# Patient Record
Sex: FEMALE | Race: BLACK OR AFRICAN AMERICAN | NOT HISPANIC OR LATINO
[De-identification: names, ages, dates, MRNs, and addresses within clinical notes are randomized per-mention and may not be internally consistent; named-entity substitution may affect disease eponyms.]

---

## 2019-05-10 ENCOUNTER — APPOINTMENT (OUTPATIENT)
Dept: OTOLARYNGOLOGY | Facility: CLINIC | Age: 49
End: 2019-05-10
Payer: COMMERCIAL

## 2019-05-10 VITALS
HEART RATE: 64 BPM | DIASTOLIC BLOOD PRESSURE: 93 MMHG | SYSTOLIC BLOOD PRESSURE: 151 MMHG | HEIGHT: 64.37 IN | WEIGHT: 162 LBS | BODY MASS INDEX: 27.66 KG/M2

## 2019-05-10 VITALS — BODY MASS INDEX: 26.96 KG/M2 | HEIGHT: 65 IN

## 2019-05-10 DIAGNOSIS — G47.33 OBSTRUCTIVE SLEEP APNEA (ADULT) (PEDIATRIC): ICD-10-CM

## 2019-05-10 DIAGNOSIS — J34.89 OTHER SPECIFIED DISORDERS OF NOSE AND NASAL SINUSES: ICD-10-CM

## 2019-05-10 DIAGNOSIS — R09.81 NASAL CONGESTION: ICD-10-CM

## 2019-05-10 DIAGNOSIS — J30.0 VASOMOTOR RHINITIS: ICD-10-CM

## 2019-05-10 PROCEDURE — 31231 NASAL ENDOSCOPY DX: CPT

## 2019-05-10 PROCEDURE — 99204 OFFICE O/P NEW MOD 45 MIN: CPT | Mod: 25

## 2019-05-10 RX ORDER — ERGOCALCIFEROL 1.25 MG/1
1.25 MG CAPSULE, LIQUID FILLED ORAL
Qty: 4 | Refills: 0 | Status: COMPLETED | COMMUNITY
Start: 2019-01-22

## 2019-05-10 RX ORDER — CHLORHEXIDINE GLUCONATE, 0.12% ORAL RINSE 1.2 MG/ML
0.12 SOLUTION DENTAL
Qty: 946 | Refills: 0 | Status: COMPLETED | COMMUNITY
Start: 2019-03-23

## 2019-05-10 RX ORDER — AMOXICILLIN 500 MG/1
500 CAPSULE ORAL
Qty: 40 | Refills: 0 | Status: COMPLETED | COMMUNITY
Start: 2019-03-23

## 2019-05-10 RX ORDER — IBUPROFEN 800 MG/1
800 TABLET, FILM COATED ORAL
Qty: 30 | Refills: 0 | Status: COMPLETED | COMMUNITY
Start: 2019-03-23

## 2019-05-10 NOTE — PROCEDURE
[FreeTextEntry6] : Afrin and lidocaine were topically sprayed. Flexible scope #2 was used. Right nasal passage with normal inferior, middle and superior turbinates. Nasal passage patent with clear middle meatus and sphenoethmoid recess. Left nasal passage with normal inferior, middle and superior turbinates. Nasal passage was patent with clear middle meatus and sphenoethmoid recess. No mucopurulence or polyps appreciated. Nasopharynx clear.

## 2019-05-10 NOTE — CONSULT LETTER
[Courtesy Letter:] : I had the pleasure of seeing your patient, [unfilled], in my office today. [Dear  ___] : Dear  [unfilled], [Please see my note below.] : Please see my note below. [Consult Closing:] : Thank you very much for allowing me to participate in the care of this patient.  If you have any questions, please do not hesitate to contact me. [Sincerely,] : Sincerely, [FreeTextEntry3] : Sonya Pablo MD\par Otolaryngology and Cranial Base Surgery\par Attending Physician - Department of Otolaryngology and Head & Neck Surgery\par [facility]\par  - [employer] at [facility]\par Office: [phone]\par Fax: [phone]\par

## 2019-05-10 NOTE — ASSESSMENT
[FreeTextEntry1] : left maxillary pain:\par - CT sinus to eval further\par - f/u after CT scan\par \par HTN\par - F/U with PMD

## 2019-05-10 NOTE — HISTORY OF PRESENT ILLNESS
[de-identified] : 47 y/o F with left maxillary sinus pressure since having root canal at 19 y/o.  Also notes left sided nasal congestion.  No frequent nasal d/c.  Notes gets 1-2 sinus infections per year.  \par Uses Flonase only if having URI or during allergy season.  Notes increased sneezing and watery eyes during spring time.  Also will use a Claritin.   No hx of allergy testing.

## 2019-05-23 ENCOUNTER — FORM ENCOUNTER (OUTPATIENT)
Age: 49
End: 2019-05-23

## 2019-05-24 ENCOUNTER — APPOINTMENT (OUTPATIENT)
Dept: CT IMAGING | Facility: CLINIC | Age: 49
End: 2019-05-24
Payer: COMMERCIAL

## 2019-05-24 ENCOUNTER — OUTPATIENT (OUTPATIENT)
Dept: OUTPATIENT SERVICES | Facility: HOSPITAL | Age: 49
LOS: 1 days | End: 2019-05-24
Payer: COMMERCIAL

## 2019-05-24 DIAGNOSIS — Z00.8 ENCOUNTER FOR OTHER GENERAL EXAMINATION: ICD-10-CM

## 2019-05-24 PROCEDURE — 70486 CT MAXILLOFACIAL W/O DYE: CPT | Mod: 26

## 2019-05-24 PROCEDURE — 70486 CT MAXILLOFACIAL W/O DYE: CPT

## 2019-06-17 ENCOUNTER — APPOINTMENT (OUTPATIENT)
Dept: PULMONOLOGY | Facility: CLINIC | Age: 49
End: 2019-06-17
Payer: COMMERCIAL

## 2019-06-17 VITALS
HEIGHT: 65 IN | BODY MASS INDEX: 27.66 KG/M2 | WEIGHT: 166 LBS | SYSTOLIC BLOOD PRESSURE: 126 MMHG | RESPIRATION RATE: 16 BRPM | DIASTOLIC BLOOD PRESSURE: 81 MMHG | HEART RATE: 73 BPM | TEMPERATURE: 97.8 F

## 2019-06-17 DIAGNOSIS — G47.33 OBSTRUCTIVE SLEEP APNEA (ADULT) (PEDIATRIC): ICD-10-CM

## 2019-06-17 PROCEDURE — 99204 OFFICE O/P NEW MOD 45 MIN: CPT | Mod: GC

## 2019-06-17 NOTE — PHYSICAL EXAM
[General Appearance - Well Developed] : well developed [Normal Appearance] : normal appearance [General Appearance - Well Nourished] : well nourished [Normal Conjunctiva] : the conjunctiva exhibited no abnormalities [Neck Appearance] : the appearance of the neck was normal [Neck Circumference: ___] : neck circumference is [unfilled] [Neck Cervical Mass (___cm)] : no neck mass was observed [Heart Sounds] : normal S1 and S2 [Apical Impulse] : the apical impulse was normal [Heart Rate And Rhythm] : heart rate was normal and rhythm regular [Respiration, Rhythm And Depth] : normal respiratory rhythm and effort [Auscultation Breath Sounds / Voice Sounds] : lungs were clear to auscultation bilaterally [Musculoskeletal - Swelling] : no joint swelling seen [Abnormal Walk] : normal gait [Nail Clubbing] : no clubbing of the fingernails [Cyanosis, Localized] : no localized cyanosis [Petechial Hemorrhages (___cm)] : no petechial hemorrhages [Skin Turgor] : normal skin turgor [Skin Color & Pigmentation] : normal skin color and pigmentation [No Focal Deficits] : no focal deficits [] : no rash [Enlarged Base of the Tongue] : enlargement of the base of the tongue [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

## 2019-06-17 NOTE — ASSESSMENT
[FreeTextEntry1] : This is a 48 year old female referred by Dr. Pablo for evaluation of possible sleep apnea. The patient has multiple signs and symptoms of sleep-disordered breathing include EDS, witnessed apnea, snoring, morning headaches, and pre-diabetic. She was referred to the [facility] for a diagnostic PSG. The ramifications of QUETA and its potential therapeutic modalities were discussed with the patient. The patient was cautioned on the importance of avoiding drowsy driving. She will follow up with us after the PSG. Given her physical facial features CPAP would be the best option-- she currently has significant nasal congestion for which she is being evaluated by ENT. this may be an important factor leading to cpap intolerance and if mitigated might allow better cpap adherence. she will f/u w ENT in this regard. if this is not success the next appropriate option if she is not able to tolerate CPAP would potentially be maxillary-mandibular advancement surgery given evidence of retrognathia. \par

## 2019-06-17 NOTE — REVIEW OF SYSTEMS
[EDS: ESS=____] : daytime somnolence: ESS=[unfilled] [Nasal Congestion] : nasal congestion [Postnasal Drip] : postnasal drip [Witnessed Apneas] : witnessed apnea [Snoring] : snoring [Shortness Of Breath] : shortness of breath [A.M. Dry Mouth] : a.m. dry mouth [A.M. Headache] : headache present upon awakening [Hypersomnolence] : sleeping much more than usual [Negative] : Psychiatric [Fatigue] : no fatigue [Difficulty Initiating Sleep] : no difficulty falling asleep [Leg Dysesthesias] : no leg dysesthesias [Difficulty Maintaining Sleep] : no difficulty maintaining sleep [Lower Extremity Discomfort] : no lower extremity discomfort [Late day/ Evening symptoms] : no late day/evening symptoms [Irresistible urge to move legs] : no irresistible urge to move legs because of lower extremity discomfort [Unusual Sleep Behavior] : no unusual sleep behavior [Cataplexy] :  no cataplexy [Hypnogogic Hallucinations] : no hypnogogic hallucinations [Hypnopompic Hallucinations] : no hypnopompic hallucinations [Sleep Paralysis] : no sleep paralysis

## 2019-06-17 NOTE — HISTORY OF PRESENT ILLNESS
[Snoring] : snoring [Obstructive Sleep Apnea] : obstructive sleep apnea [Frequent Nocturnal Awakening] : frequent nocturnal awakening [Daytime Somnolence] : daytime somnolence [Witnessed Apneas] : witnessed sleep apnea [Unintentional Sleep while Active] : unintentional sleep while active [ESS: ___] : ESS score [unfilled] [Awakes with Headache] : headache upon awakening [Awakes Unrefreshed] : awakening unrefreshed [Unintentional Sleep While Inactive] : unintentional sleep while inactive [Awakening With Dry Mouth] : awakening with dry mouth [Hypersomnolence] : hypersomnolence [FreeTextEntry1] : Ms. Canales is a 48 year old female with a pmhx of QUETA here for an evaluation of her QUETA. She states she was diagnosed with QUETA 8 years ago and was given a CPAP machine but was unable to tolerate the pressure. She attempted several masks and about a 4 month period but was not able to tolerate it ultimately. She continues to have symptoms of QUETA, specifically EDS and frequent nocturnal awakenings. She does admit she wakes up gasping for air at times and describes almost a "panic" like feeling when she wakes up in the middle of the night. Her EDS inhibits her from driving long distances. \par \par Of note, the patient has a significant amount of sinus issues which is being addressed by Dr. Pablo. There is concern of a blocked sinus on her left side and also a partial nasal obstruction.  [Recent  Weight Gain] : no recent weight gain [DIS] : no DIS [DMS] : no DMS [Unusual Sleep Behavior] : no unusual sleep behavior [Cataplexy] : no cataplexy [Sleep Paralysis] : no sleep paralysis [Hypnopompic Hallucinations] : no hallucinations when awakening [Hypnagogic Hallucinations] : no hallucinations when falling asleep

## 2020-12-30 ENCOUNTER — RESULT REVIEW (OUTPATIENT)
Age: 50
End: 2020-12-30

## 2023-12-26 ENCOUNTER — APPOINTMENT (OUTPATIENT)
Dept: ORTHOPEDIC SURGERY | Facility: CLINIC | Age: 53
End: 2023-12-26
Payer: COMMERCIAL

## 2023-12-26 VITALS — BODY MASS INDEX: 29.57 KG/M2 | HEIGHT: 66 IN | WEIGHT: 184 LBS

## 2023-12-26 DIAGNOSIS — Z78.9 OTHER SPECIFIED HEALTH STATUS: ICD-10-CM

## 2023-12-26 DIAGNOSIS — M54.2 CERVICALGIA: ICD-10-CM

## 2023-12-26 PROCEDURE — 73010 X-RAY EXAM OF SHOULDER BLADE: CPT | Mod: LT

## 2023-12-26 PROCEDURE — 73030 X-RAY EXAM OF SHOULDER: CPT | Mod: LT

## 2023-12-26 PROCEDURE — 99203 OFFICE O/P NEW LOW 30 MIN: CPT | Mod: 25

## 2023-12-26 PROCEDURE — 72050 X-RAY EXAM NECK SPINE 4/5VWS: CPT

## 2023-12-26 PROCEDURE — 72110 X-RAY EXAM L-2 SPINE 4/>VWS: CPT

## 2023-12-26 PROCEDURE — 73503 X-RAY EXAM HIP UNI 4/> VIEWS: CPT | Mod: RT

## 2023-12-27 NOTE — IMAGING
[Straightening consistent with spasm] : Straightening consistent with spasm [de-identified] : Neck: - No obvious deformity, swelling, or bruising - No pain with palpation of spinous processes - L sided paraspinals tender to palpation - ROM limited throughout all planes with pain on flexion, extension, sidebending.  - 5/5 strength throughout UE evaluation bilaterally  - Painful Spurling Maneuver - Distally neurovascularly intact  L shoulder: - No obvious deformity or swelling - Pain over AC and anterior shoulder - No pain with palpation over AC joint, posterior shoulder - Forward flexion to 160 degrees, External rotation to 30 degrees, Internal rotation to L spine - 4/5 strength with internal and external rotation -Painful Empty can - Painful impingement testing - Painful Speeds - Distally neurovascularly intact   R hip: - No obvious deformity or swelling -Tenderness to palpation of greater trochanter - Majority of pain over R SI, gluteal muscle belly -Pain with full flexion, internal/external rotation -4+/5 strength hip flexion, abduction, adduction -Painful PITO - Knee pain with FADIR - Negative Log roll - Distally neurovascularly intact     [Facet arthropathy] : Facet arthropathy [Disc space narrowing] : Disc space narrowing [Right] : right hip [There are no fractures, subluxations or dislocations. No significant abnormalities are seen] : There are no fractures, subluxations or dislocations. No significant abnormalities are seen [Mild arthritis (Tonnis Grade 1)] : Mild arthritis (Tonnis Grade 1)

## 2023-12-27 NOTE — PHYSICAL EXAM
[de-identified] : Constitutional: Well developed, well nourished, able to communicate Neuro: Normal sensation, No focal deficits Skin: Intact CV: Peripheral vascular exam grossly normal Pulm: No signs of respiratory distress Psych: Oriented, normal mood and affect
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
## 2023-12-27 NOTE — ASSESSMENT
[FreeTextEntry1] : Chronic left-sided neck and shoulder pain -More concerning on exam for possible cervical radiculopathy given positive Spurling's and pain radiation from the neck down to the shoulder.  X-ray showing findings of degenerative disc disease as well -Will obtain MRI cervical spine -Physical therapy referral  Chronic right hip pain with pain down the right lower extremity - Also with findings of likely radicular symptoms  - MRI lumbar spine - PT referral  - Follow up after imaging

## 2024-01-17 ENCOUNTER — APPOINTMENT (OUTPATIENT)
Dept: MRI IMAGING | Facility: CLINIC | Age: 54
End: 2024-01-17
Payer: COMMERCIAL

## 2024-01-17 PROCEDURE — 72141 MRI NECK SPINE W/O DYE: CPT

## 2024-01-17 PROCEDURE — 72148 MRI LUMBAR SPINE W/O DYE: CPT

## 2024-01-22 ENCOUNTER — APPOINTMENT (OUTPATIENT)
Dept: ORTHOPEDIC SURGERY | Facility: CLINIC | Age: 54
End: 2024-01-22
Payer: COMMERCIAL

## 2024-01-22 PROCEDURE — 99213 OFFICE O/P EST LOW 20 MIN: CPT
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
## 2024-01-23 NOTE — ASSESSMENT
[FreeTextEntry1] : Chronic left-sided neck and shoulder pain. More concerning on exam for possible cervical radiculopathy given positive Spurling's and pain radiation from the neck down to the shoulder.  X-ray showing findings of degenerative disc disease as well. Cervical MRI showing disc bulge with herniation and left foraminal stenosis at c5-c6.  - Pain management referral to discuss cervical injection therapy -Physical therapy referral - MRI showing mild Chiari findings. She has an upcoming appt with a neurologist, discussed talking with them about this.   Chronic right hip pain with pain down the right lower extremity. Likely radicular symptoms. Lumbar MRI showing L2-L3 R sided foraminal herniation/stenosis w/ facet effusion; L3-L4 R sided facet effusion, L4-L5 R sided facet effusion, and L5-S1 right foraminal herniation/stenosis  - Pain referral to discuss LESI - PT referral - Discussed use of gabapentin as a possibility for each issue but she would like to learn more about procedural modalities first.

## 2024-01-23 NOTE — IMAGING
[Straightening consistent with spasm] : Straightening consistent with spasm [Facet arthropathy] : Facet arthropathy [Disc space narrowing] : Disc space narrowing [Right] : right hip [There are no fractures, subluxations or dislocations. No significant abnormalities are seen] : There are no fractures, subluxations or dislocations. No significant abnormalities are seen [Mild arthritis (Tonnis Grade 1)] : Mild arthritis (Tonnis Grade 1) [de-identified] : Neck: - No obvious deformity, swelling, or bruising - No pain with palpation of spinous processes - L sided paraspinals tender to palpation - ROM limited throughout all planes with pain on flexion, extension, sidebending.  - 5/5 strength throughout UE evaluation bilaterally  - Painful Spurling Maneuver - Distally neurovascularly intact  L shoulder: - No obvious deformity or swelling - Pain over AC and anterior shoulder - No pain with palpation over AC joint, posterior shoulder - Forward flexion to 160 degrees, External rotation to 30 degrees, Internal rotation to L spine - 4/5 strength with internal and external rotation -Painful Empty can - Painful impingement testing - Painful Speeds - Distally neurovascularly intact   R hip: - No obvious deformity or swelling -Tenderness to palpation of greater trochanter - Majority of pain over R SI, gluteal muscle belly -Pain with full flexion, internal/external rotation -4+/5 strength hip flexion, abduction, adduction -Painful PITO - Knee pain with FADIR - Negative Log roll - Distally neurovascularly intact

## 2024-01-23 NOTE — PHYSICAL EXAM
[de-identified] : Constitutional: Well developed, well nourished, able to communicate Neuro: Normal sensation, No focal deficits Skin: Intact CV: Peripheral vascular exam grossly normal Pulm: No signs of respiratory distress Psych: Oriented, normal mood and affect

## 2024-01-31 PROBLEM — M62.838 CERVICAL PARASPINAL MUSCLE SPASM: Status: ACTIVE | Noted: 2023-12-26

## 2024-02-01 ENCOUNTER — APPOINTMENT (OUTPATIENT)
Dept: PAIN MANAGEMENT | Facility: CLINIC | Age: 54
End: 2024-02-01
Payer: COMMERCIAL

## 2024-02-01 VITALS — WEIGHT: 184 LBS | BODY MASS INDEX: 29.57 KG/M2 | HEIGHT: 66 IN

## 2024-02-01 DIAGNOSIS — M62.838 OTHER MUSCLE SPASM: ICD-10-CM

## 2024-02-01 PROCEDURE — 99204 OFFICE O/P NEW MOD 45 MIN: CPT

## 2024-02-01 RX ORDER — METHOCARBAMOL 750 MG/1
750 TABLET, FILM COATED ORAL TWICE DAILY
Qty: 60 | Refills: 1 | Status: ACTIVE | COMMUNITY
Start: 2024-02-01 | End: 1900-01-01

## 2024-02-01 NOTE — PHYSICAL EXAM
[de-identified] : Gen: NAD Head: NC/AT Neck: limited cervical rotation to the LEFT, +spurling's on the left Eyes: wears glasses, no scleral icterus ENT: mucous membranes moist CV: RRR, S1 S2, no mrg Lungs: CTAB, nonlabored breathing Abd: soft, NT/ND Ext: full ROM in all extremities, no peripheral edema Back: +TTP in the right low lumbar facet region; +SLR on the right Neuro: CN intact UEs +5 L +5 R shoulder abduction +5 L +5 R arm abduction +5 L +5 R forearm flexion +5 L +5 R forearm extension +5 L +5 R finger flexion +5 L +5 R  strength LEs +5 L +5 R hip flexion +5 L +5 R leg extension +5 L +5 R leg flexion +5 L +5 R foot dorsiflexion +5 L +5 R foot plantarflexion +5 L +5 R EHL extension Psych: normal affect Skin: no visible lesions

## 2024-02-01 NOTE — DATA REVIEWED
[FreeTextEntry1] : Lumbar Spine (1/17/2024): L4-L5: right facet effusion L5-S1: vacuum disc, broad disc bulge, facet hypertrophy with right-sided disc herniation and RIGHT NF stenosis  Cervical (1/17/2024): C5-C6: broad disc bulge and hernaition impressing the thecal sac causing LEFT NF stenosis

## 2024-02-01 NOTE — DISCUSSION/SUMMARY
[de-identified] : IAM RAO is a 53 year-old woman presenting for a NPV for a history of chronic neck and low back pain.   Prior treatment: Physical therapy x6 weeks without improvement OTC NSAIDs Acetaminophen  Plan: 1) MRI cervical and lumbar spine images reviewed with the patient. 2) Discussed C7-T1 BRANDON or RIGHT L5-S1 TFESI. 3) Initiate physical therapy--script provided 4) Trial diclofenac 50mg BID prn; Discussed the risks of NSAIDs, including worsening HTN, cardiovascular risks, GI risk, renal injury. The patient was told not to take other NSAIDs with this and to consult with their PCP if there is a significant medical history to warrant this prior to initiating treatment.  5) Trial methocarbamol 750mg BID prn; Discussed AEs, including sedation, dizziness, somnolence. Patient told to use caution when driving after taking the first few doses. 6) Encouraged the patient to start a home exercise regimen 7) RTC 2 months

## 2024-02-01 NOTE — HISTORY OF PRESENT ILLNESS
[Neck] : neck [10] : 10 [Dull/Aching] : dull/aching [Radiating] : radiating [Sharp] : sharp [Stabbing] : stabbing [Tightness] : tightness [Tingling] : tingling [Constant] : constant [Household chores] : household chores [Sleep] : sleep [Meds] : meds [Sitting] : sitting [Walking] : walking [Bending forward] : bending forward [Exercising] : exercising [Full time] : Work status: full time [] : no [FreeTextEntry1] : left shoulder, right hip [FreeTextEntry7] : right carl

## 2024-03-18 ENCOUNTER — RX RENEWAL (OUTPATIENT)
Age: 54
End: 2024-03-18

## 2024-03-18 RX ORDER — DICLOFENAC POTASSIUM 50 MG/1
50 TABLET, COATED ORAL 3 TIMES DAILY
Qty: 60 | Refills: 1 | Status: ACTIVE | COMMUNITY
Start: 2024-02-01 | End: 1900-01-01

## 2024-04-03 PROBLEM — M53.3 SACROILIAC DYSFUNCTION: Status: ACTIVE | Noted: 2023-12-26

## 2024-04-03 PROBLEM — M47.816 LUMBAR SPONDYLOSIS: Status: ACTIVE | Noted: 2023-12-26

## 2024-04-03 PROBLEM — M54.12 CERVICAL RADICULOPATHY, ACUTE: Status: ACTIVE | Noted: 2023-12-26

## 2024-04-03 PROBLEM — M54.41 CHRONIC RIGHT-SIDED LOW BACK PAIN WITH RIGHT-SIDED SCIATICA: Status: ACTIVE | Noted: 2023-12-26

## 2024-04-03 PROBLEM — M54.16 LUMBAR RADICULOPATHY, CHRONIC: Status: ACTIVE | Noted: 2023-12-26

## 2024-04-04 ENCOUNTER — APPOINTMENT (OUTPATIENT)
Dept: PAIN MANAGEMENT | Facility: CLINIC | Age: 54
End: 2024-04-04

## 2024-04-04 DIAGNOSIS — M53.3 SACROCOCCYGEAL DISORDERS, NOT ELSEWHERE CLASSIFIED: ICD-10-CM

## 2024-04-04 DIAGNOSIS — M54.12 RADICULOPATHY, CERVICAL REGION: ICD-10-CM

## 2024-04-04 DIAGNOSIS — M47.816 SPONDYLOSIS W/OUT MYELOPATHY OR RADICULOPATHY, LUMBAR REGION: ICD-10-CM

## 2024-04-04 DIAGNOSIS — M54.16 RADICULOPATHY, LUMBAR REGION: ICD-10-CM

## 2024-04-04 DIAGNOSIS — G89.29 LUMBAGO WITH SCIATICA, RIGHT SIDE: ICD-10-CM

## 2024-04-04 DIAGNOSIS — M54.41 LUMBAGO WITH SCIATICA, RIGHT SIDE: ICD-10-CM

## 2024-04-08 NOTE — PHYSICAL EXAM
[de-identified] : Gen: NAD Head: NC/AT Neck: limited cervical rotation to the LEFT, +spurling's on the left Eyes: wears glasses, no scleral icterus ENT: mucous membranes moist CV: RRR, S1 S2, no mrg Lungs: CTAB, nonlabored breathing Abd: soft, NT/ND Ext: full ROM in all extremities, no peripheral edema Back: +TTP in the right low lumbar facet region; +SLR on the right Neuro: CN intact UEs +5 L +5 R shoulder abduction +5 L +5 R arm abduction +5 L +5 R forearm flexion +5 L +5 R forearm extension +5 L +5 R finger flexion +5 L +5 R  strength LEs +5 L +5 R hip flexion +5 L +5 R leg extension +5 L +5 R leg flexion +5 L +5 R foot dorsiflexion +5 L +5 R foot plantarflexion +5 L +5 R EHL extension Psych: normal affect Skin: no visible lesions

## 2024-04-08 NOTE — DISCUSSION/SUMMARY
[de-identified] : IAM RAO is a 53 year-old woman presenting for a RPV for a history of chronic neck and low back pain.   Prior treatment: Physical therapy x6 weeks without improvement OTC NSAIDs Acetaminophen  Plan: 1) MRI cervical and lumbar spine images reviewed with the patient. 2) Discussed C7-T1 BRANDON or RIGHT L5-S1 TFESI. 3) Initiate physical therapy--script provided 4) Trial diclofenac 50mg BID prn; Discussed the risks of NSAIDs, including worsening HTN, cardiovascular risks, GI risk, renal injury. The patient was told not to take other NSAIDs with this and to consult with their PCP if there is a significant medical history to warrant this prior to initiating treatment.  5) Trial methocarbamol 750mg BID prn; Discussed AEs, including sedation, dizziness, somnolence. Patient told to use caution when driving after taking the first few doses. 6) Encouraged the patient to start a home exercise regimen 7) RTC 2 months

## 2024-04-08 NOTE — HISTORY OF PRESENT ILLNESS
[Neck] : neck [10] : 10 [Radiating] : radiating [Dull/Aching] : dull/aching [Stabbing] : stabbing [Sharp] : sharp [Tightness] : tightness [Tingling] : tingling [Constant] : constant [Household chores] : household chores [Sleep] : sleep [Meds] : meds [Walking] : walking [Sitting] : sitting [Exercising] : exercising [Bending forward] : bending forward [Full time] : Work status: full time [] : no [FreeTextEntry1] : left shoulder, right hip [FreeTextEntry7] : right carl

## 2024-04-08 NOTE — DATA REVIEWED
[Lumbar Spine] : lumbar spine [MRI] : MRI [Report was reviewed and noted in the chart] : The report was reviewed and noted in the chart [Cervical Spine] : cervical spine [I independently reviewed and interpreted images and report] : I independently reviewed and interpreted images and report [FreeTextEntry1] : Lumbar Spine (1/17/2024): L4-L5: right facet effusion L5-S1: vacuum disc, broad disc bulge, facet hypertrophy with right-sided disc herniation and RIGHT NF stenosis  Cervical (1/17/2024): C5-C6: broad disc bulge and hernaition impressing the thecal sac causing LEFT NF stenosis